# Patient Record
Sex: MALE | Race: WHITE | NOT HISPANIC OR LATINO | URBAN - METROPOLITAN AREA
[De-identification: names, ages, dates, MRNs, and addresses within clinical notes are randomized per-mention and may not be internally consistent; named-entity substitution may affect disease eponyms.]

---

## 2017-09-25 ENCOUNTER — EMERGENCY (EMERGENCY)
Facility: HOSPITAL | Age: 27
LOS: 0 days | Discharge: HOME | End: 2017-09-25
Attending: EMERGENCY MEDICINE | Admitting: EMERGENCY MEDICINE
Payer: COMMERCIAL

## 2017-09-25 VITALS
OXYGEN SATURATION: 100 % | SYSTOLIC BLOOD PRESSURE: 105 MMHG | HEIGHT: 69 IN | HEART RATE: 89 BPM | WEIGHT: 149.91 LBS | TEMPERATURE: 99 F | RESPIRATION RATE: 18 BRPM | DIASTOLIC BLOOD PRESSURE: 78 MMHG

## 2017-09-25 DIAGNOSIS — M79.601 PAIN IN RIGHT ARM: ICD-10-CM
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
## 2017-09-25 DIAGNOSIS — S50.11XA CONTUSION OF RIGHT FOREARM, INITIAL ENCOUNTER: ICD-10-CM

## 2017-09-25 PROCEDURE — 99282 EMERGENCY DEPT VISIT SF MDM: CPT

## 2017-09-25 NOTE — ED ADULT NURSE NOTE - OBJECTIVE STATEMENT
patient c/o right arm pain that started after donating platelets today. small hematoma to right antecubital. pain worse with movement

## 2017-09-25 NOTE — ED PROVIDER NOTE - OBJECTIVE STATEMENT
26 yo male pw right ac pain, swelling and bruising after donating blood and giving platelets earlier today no current bleeding.

## 2017-09-25 NOTE — ED ADULT TRIAGE NOTE - CHIEF COMPLAINT QUOTE
Patient donated platelets today and c/o right upper arm pain above IV site.  Patient denies medications and PMH.

## 2017-09-25 NOTE — ED PROVIDER NOTE - MUSCULOSKELETAL, MLM
right ac with ttp, blue ecchymosis and swelling, +2 radial pulse, +2 brachial pulse, from arm and wrist, cap refill of fingers

## 2019-06-29 ENCOUNTER — TRANSCRIPTION ENCOUNTER (OUTPATIENT)
Age: 29
End: 2019-06-29